# Patient Record
Sex: FEMALE | Race: BLACK OR AFRICAN AMERICAN | NOT HISPANIC OR LATINO | Employment: STUDENT | URBAN - METROPOLITAN AREA
[De-identification: names, ages, dates, MRNs, and addresses within clinical notes are randomized per-mention and may not be internally consistent; named-entity substitution may affect disease eponyms.]

---
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
## 2024-01-18 ENCOUNTER — OFFICE VISIT (OUTPATIENT)
Dept: SPORTS MEDICINE | Facility: CLINIC | Age: 19
End: 2024-01-18
Payer: MEDICAID

## 2024-01-18 ENCOUNTER — CLINICAL SUPPORT (OUTPATIENT)
Dept: REHABILITATION | Facility: HOSPITAL | Age: 19
End: 2024-01-18
Payer: COMMERCIAL

## 2024-01-18 VITALS
BODY MASS INDEX: 40.76 KG/M2 | SYSTOLIC BLOOD PRESSURE: 136 MMHG | WEIGHT: 268.94 LBS | DIASTOLIC BLOOD PRESSURE: 86 MMHG | HEIGHT: 68 IN | HEART RATE: 90 BPM

## 2024-01-18 DIAGNOSIS — R29.898 DECREASED STRENGTH OF LOWER EXTREMITY: ICD-10-CM

## 2024-01-18 DIAGNOSIS — M25.661 DECREASED ROM OF RIGHT KNEE: Primary | ICD-10-CM

## 2024-01-18 DIAGNOSIS — S83.271D COMPLEX TEAR OF LATERAL MENISCUS OF RIGHT KNEE AS CURRENT INJURY, SUBSEQUENT ENCOUNTER: ICD-10-CM

## 2024-01-18 DIAGNOSIS — Z98.890 S/P LATERAL MENISCUS REPAIR OF RIGHT KNEE: Primary | ICD-10-CM

## 2024-01-18 PROCEDURE — 99999 PR PBB SHADOW E&M-EST. PATIENT-LVL III: CPT | Mod: PBBFAC,,, | Performed by: STUDENT IN AN ORGANIZED HEALTH CARE EDUCATION/TRAINING PROGRAM

## 2024-01-18 PROCEDURE — 3075F SYST BP GE 130 - 139MM HG: CPT | Mod: CPTII,,, | Performed by: STUDENT IN AN ORGANIZED HEALTH CARE EDUCATION/TRAINING PROGRAM

## 2024-01-18 PROCEDURE — 97110 THERAPEUTIC EXERCISES: CPT

## 2024-01-18 PROCEDURE — 1159F MED LIST DOCD IN RCRD: CPT | Mod: CPTII,,, | Performed by: STUDENT IN AN ORGANIZED HEALTH CARE EDUCATION/TRAINING PROGRAM

## 2024-01-18 PROCEDURE — 3079F DIAST BP 80-89 MM HG: CPT | Mod: CPTII,,, | Performed by: STUDENT IN AN ORGANIZED HEALTH CARE EDUCATION/TRAINING PROGRAM

## 2024-01-18 PROCEDURE — 99024 POSTOP FOLLOW-UP VISIT: CPT | Mod: ,,, | Performed by: STUDENT IN AN ORGANIZED HEALTH CARE EDUCATION/TRAINING PROGRAM

## 2024-01-18 PROCEDURE — 99213 OFFICE O/P EST LOW 20 MIN: CPT | Mod: PBBFAC | Performed by: STUDENT IN AN ORGANIZED HEALTH CARE EDUCATION/TRAINING PROGRAM

## 2024-01-18 PROCEDURE — 1160F RVW MEDS BY RX/DR IN RCRD: CPT | Mod: CPTII,,, | Performed by: STUDENT IN AN ORGANIZED HEALTH CARE EDUCATION/TRAINING PROGRAM

## 2024-01-18 NOTE — PROGRESS NOTES
Physical Therapy Daily Treatment Note     Name: Kanika Thapa  Clinic Number: [id number]    Therapy Diagnosis:   Encounter Diagnoses   Name Primary?    Decreased ROM of right knee Yes    Decreased strength of lower extremity        Physician: Garcia Christianson MD    Visit Date: 1/18/2024    Physician Orders: PT Eval and Treat   Medical Diagnosis from Referral: S83.271A (ICD-10-CM) - Complex tear of lateral meniscus of right knee as current injury, initial encounter   Evaluation Date: 10/30/2023  Authorization Period Expiration: 12/31/2023  Plan of Care Expiration: 5/13/2024  Visit # / Visits authorized: 1/80 (14 total)  FOTO administered 1/18/24     Time In: 10:01 AM  Time Out: 11:00 AM  Total Billable Time: 59 minutes      Precautions: Standard     Procedure by Meghan: 10/27/23  Right knee arthroscopic lateral meniscus repair - 5 sutures  Right knee arthroscopic synovectomy and lysis of adhesions  Right knee arthroscopic chondroplasty of patella and lateral femoral condyle    POSTOPERATIVE PLAN: She will be placed in a T scope brace from 0-90 degrees. Nonweightbearing. Follow the rehabilitation protocol for meniscal repair. Return to clinic in 2 weeks. We will fit her for a lateral  brace.     12/7 MD note:   Continue physical therapy and advance per protocol as tolerated.  At this point we will leave the brace unlocked at all times while weight-bearing. Brace only needs to be worn while ambulating.  In about 1 week we can discontinue use of the brace.     Subjective     Pt reports: returned from trip to [place]. Knee felt really good the entire trip. I did a lot of walking but I feel like it helped me walk a little better. Just had f/u with Dr. Christianson and he was pleased with my progress up to this point  She was compliant with home exercise program.  Functional change: none    Pain: 0/10  Location: right knee      Objective     DOS: 10/27/23  POD: 11 weeks, 6 days as of 1/18    Knee Passive Range of  "Motion:    Right  Left    Flexion 125 135   Extension 10 hyper 15 hyper     Quad set: fair    Treatment      Kanika received therapeutic exercises to develop strength, endurance, ROM, flexibility, posture, and core stabilization for 8 minutes including:  LLLD heel prop x 8' 5# above/below      Not today:  Heel slides 3x10 5s holds  Calf raises behind mat 3 x 15  Pt education: issued printout of HEP with detailed order of exercises and frequency, use of single crutch if pain/swelling increases or if her gait is altered    Kanika received the following manual therapy techniques: Joint mobilizations and Soft tissue Mobilization were applied to the: knee for 02 minutes, including:  Assessment of knee ROM  Fat pad mobs  Patellar mobs  Ext hinge mob    Kanika participated in neuromuscular re-education activities to improve: Balance, Coordination, Kinesthetic, Sense, Proprioception, and Posture for 30 minutes. The following activities were included:  DL bridge 3x10 3" holds  Long sitting SLR 3x10  20" RLE bias box squat 3x12  Resisted lateral steps GTB x 3 laps  4" Lateral step down 3x10 with UE support     Not today  SL shuttle squat 2.5 cords 3x10B  Quad sets into hyperext c strapA 10" hold x 4'  Red power band TKE 3x10  6" forward step up 3x10  24" RLE bias box squat 3x10  DL shuttle squat 4 cords 3x10 above 90 deg  Red PB TKE 10" hold x 4'      Kanika participated in dynamic functional therapeutic activities to improve functional performance for 19 minutes including:  Stationary bike 10' to improve CV endurance and tissue extensibility   Split squat 2x10B    Not today:  45# sled push/pull x 3 laps      Home Exercises Provided and Patient Education Provided     Education provided:   - Pt educated on POC and HEP    Written Home Exercises Provided: yes.  Exercises were reviewed and Kanika was able to demonstrate them prior to the end of the session.  Kanika demonstrated good  understanding of the education provided.     See EMR " under Patient Instructions for exercises provided prior visit.    Assessment   Kanika returned to formal PT for first time since 12/29 after vacation. Upon assessment she continues to demo mild knee extension ROM deficits upon arrival as well as continues to lack terminal knee flexion ROM however seems to be improving each session. Session today focused towards progressive CKC functional movement patterns. Noticeable hip drop with lateral step down that improved with modifying to shorten box as well as with reps. Continues to be limited functionally 2/2 significant LE strength deficits. She would continue to benefit from skilled PT services to address the before mentioned deficits in order to normalize RLE function    Kanika Is progressing well towards her goals.   Pt prognosis is Good.     Pt will continue to benefit from skilled outpatient physical therapy to address the deficits listed in the problem list box on initial evaluation, provide pt/family education and to maximize pt's level of independence in the home and community environment.     Pt's spiritual, cultural and educational needs considered and pt agreeable to plan of care and goals.     Anticipated barriers to physical therapy: none    Goals:    Post-op Knee   Short Term Goals ( 10 Weeks ):   1. Pt will report reduced pain by 20 to 40% or greater for improved mobility, sleep  and ambulation.  MET  2. Patients knee edema reduced by 1/4 to 1/2 inch or greater to enhance flexion ROM and knee comfort. MET   3. Pt to report minimal to no pain to palpation for improved level of comfort.  MET  4. Pt to demonstrate symmetrical weight bearing w/o increased pain for stability and functional ambulation . IN progress  5. Pts neuromuscular response will be enhanced for unilateral standing stability and balance  Met  6. Pt to achieve 90 degrees of passive knee flexion for restoring functional knee mobility, ambulating with proper gait pattern and sit to stand ability.   MET  7 Pt to report understanding of all instruction pertinent to patient safety , gait instruction and HEP.    8. Pt to exhibit correct return demonstration of exercises for self-management and independence with HEP     Long Term Goals (28 Weeks):  1. Pt will demonstrate increased knee flexion AROM to 120 degrees or greater for return to functional activity  2. Pt will demonstrate increased knee extension AROM to 0 degrees for standing stability   3. Pt will demonstrate increased RLE strength by 1/2 to 1 grade for improved functional stability  4. Pt to demonstrate standing stability unsupported on dynamic surfaces for functional return to ADL and recreational activities.   5.The patient will be independent amb with no assistive device on all surfaces for community distances.  6. Pt to demonstrate independence with HEP for self management  7. Patient will return to jogging for fitness and recreation 3x/wk within 3 months.  8. Patient will improve FOTO score to see FOTO% limited to decrease perceived limitation with mobility.    Plan     Plan of care Certification: 10/30/2023 to 5/13/2024.     Outpatient Physical Therapy 1-2 times weekly for 28 weeks to include the following interventions: Electrical Stimulation NMES, Gait Training, Manual Therapy, Neuromuscular Re-ed, Patient Education, Self Care, Therapeutic Activities, and Therapeutic Exercise.       JÚNIOR HINTON, PT, DPT, SCS

## 2024-01-18 NOTE — PROGRESS NOTES
Subjective:     Chief Complaint: Kanika Thapa is a 18 y.o. female who had concerns including Post-op Evaluation of the Right Knee.    HPI    Kanika Thapa is a 18 y.o. female presents status post below.  She was doing well.  She has no pain.  She went to [place] several weeks ago and did a lot of walking and had no episodes of pain, mechanical symptoms, or instability with the knee.  She is very pleased with the progress she is made thus far.    PROCEDURE PERFORMED by Garcia Christianson MD on 10/27/2023:   Right knee arthroscopic lateral meniscus repair  Right knee arthroscopic synovectomy and lysis of adhesions  Right knee arthroscopic chondroplasty of patella and lateral femoral condyle    Review of Systems   Constitutional: Negative.   HENT: Negative.     Eyes: Negative.    Cardiovascular: Negative.    Respiratory: Negative.     Endocrine: Negative.    Hematologic/Lymphatic: Negative.    Skin: Negative.    Musculoskeletal:  Positive for muscle weakness. Negative for joint pain and stiffness.   Neurological: Negative.    Psychiatric/Behavioral: Negative.     Allergic/Immunologic: Negative.        Pain Related Questions  Over the past 3 days, what was your highest pain level?: 0  Over the past 3 days, what was your lowest pain level? : 0    Other  How many nights a week are you awakened by your affected body part?: 0  Was the patient's HEIGHT measured or patient reported?: Patient Reported  Was the patient's WEIGHT measured or patient reported?: Measured    Objective:     General: Kanika is well-developed, well-nourished, appears stated age, in no acute distress, alert and oriented to time, place and person.     General    Nursing note and vitals reviewed.  Constitutional: She is oriented to person, place, and time. She appears well-developed and well-nourished. No distress.   HENT:   Head: Normocephalic and atraumatic.   Nose: Nose normal.   Eyes: EOM are normal.   Cardiovascular:  Intact distal pulses.             Pulmonary/Chest: Effort normal. No respiratory distress.   Neurological: She is alert and oriented to person, place, and time.   Psychiatric: She has a normal mood and affect. Her behavior is normal. Judgment and thought content normal.     General Musculoskeletal Exam   Gait: normal       Right Knee Exam     Inspection   Erythema: absent  Scars: present  Swelling: absent  Effusion: absent  Deformity: absent  Bruising: absent    Range of Motion   Extension:  -5   Flexion:  130     Tests   Ligament Examination   Lachman: normal (-1 to 2mm)   PCL-Posterior Drawer: normal (0 to 2mm)     MCL - Valgus: normal (0 to 2mm)  LCL - Varus: normal    Other   Sensation: normal    Left Knee Exam     Range of Motion   Extension:  -10   Flexion:  140     Tests   Stability   Lachman: normal (-1 to 2mm)   PCL-Posterior Drawer: normal (0 to 2mm)  MCL - Valgus: normal (0 to 2mm)  LCL - Varus: normal (0 to 2mm)    Muscle Strength   Right Lower Extremity   Quadriceps:  4/5   Hamstrin/5     Vascular Exam     Right Pulses  Dorsalis Pedis:      2+  Posterior Tibial:      2+              Assessment:   Kanika Thapa is a 18 y.o. female status post above and doing well  Encounter Diagnoses   Name Primary?    S/P lateral meniscus repair of right knee Yes    Complex tear of lateral meniscus of right knee as current injury, subsequent encounter         Plan:     She is doing very well.  She can continue physical therapy and advance per protocol as tolerated.  Return to clinic in 2 months.    All of the patient's questions were answered. Patient was advised to call the clinic or contact me through the patient portal for any questions or concerns.         Patient questionnaires may have been collected.

## 2024-01-25 ENCOUNTER — CLINICAL SUPPORT (OUTPATIENT)
Dept: REHABILITATION | Facility: HOSPITAL | Age: 19
End: 2024-01-25
Payer: MEDICAID

## 2024-01-25 DIAGNOSIS — M25.661 DECREASED ROM OF RIGHT KNEE: Primary | ICD-10-CM

## 2024-01-25 DIAGNOSIS — R29.898 DECREASED STRENGTH OF LOWER EXTREMITY: ICD-10-CM

## 2024-01-25 PROCEDURE — 97110 THERAPEUTIC EXERCISES: CPT | Mod: CQ

## 2024-01-25 NOTE — PROGRESS NOTES
"  Physical Therapy Daily Treatment Note     Name: Kanika Thapa  Clinic Number: [id number]    Therapy Diagnosis:   Encounter Diagnoses   Name Primary?    Decreased ROM of right knee Yes    Decreased strength of lower extremity        Physician: Garcia Christianson MD    Visit Date: 1/25/2024    Physician Orders: PT Eval and Treat   Medical Diagnosis from Referral: S83.271A (ICD-10-CM) - Complex tear of lateral meniscus of right knee as current injury, initial encounter   Evaluation Date: 10/30/2023  Authorization Period Expiration: 12/31/2023  Plan of Care Expiration: 5/13/2024  Visit # / Visits authorized: 2/80 (15 total)  FOTO administered 1/18/24     Time In: 1210  Time Out: 1305  Total Billable Time: 53 minutes    Precautions: Standard     Procedure by Meghan: 10/27/23  Right knee arthroscopic lateral meniscus repair - 5 sutures  Right knee arthroscopic synovectomy and lysis of adhesions  Right knee arthroscopic chondroplasty of patella and lateral femoral condyle    POSTOPERATIVE PLAN: She will be placed in a T scope brace from 0-90 degrees. Nonweightbearing. Follow the rehabilitation protocol for meniscal repair. Return to clinic in 2 weeks. We will fit her for a lateral  brace.     Subjective     Pt reports: no c/o knee pain upon entry. She wants to know if she can go to the gym after PT today.     She was compliant with home exercise program.  Functional change: none    Pain: 0/10  Location: right knee      Objective     DOS: 10/27/23  POD: 12 weeks, 6 days as of 1/25    Knee Passive Range of Motion:    Right  Left    Flexion 125 135   Extension 10 hyper 15 hyper     Quad set: fair    Treatment      Approved codes: 87693    Kanika received therapeutic exercises to develop strength, endurance, ROM, flexibility, posture, and core stabilization for 50 minutes including:    Upright bike lvl 6 x 8' for joint mobility/cardiovascular endurance  Ext hinge strap stretch 5 x 20"   Quad sets into hyperext 10 x 10" " "  Long sitting SLR 3 x 8 x 3"   Waddles GTB x 2 turf laps  Attempted 20in RLE bias DL squats -> no bias DL squat, but d/c d/t poor form  DL leg press 125# 3 x 10  SL leg press 80# 3 x 8  SL calf raises 3 x 10 ofelia   Pt education: gym routine    Next tx - OKC quad    Not today:  LLLD heel prop x 8' 5# above/below  Heel slides 3x10 5s holds  DL bridge 3x10 3" holds  Red power band TKE 3x10  6" forward step up 3x10  Red PB TKE 10" hold x 4'  45# sled push/pull x 3 laps  4" Lateral step down 3x10 with UE support   Split squat 2x10B      Home Exercises Provided and Patient Education Provided     Education provided:   - Pt educated on POC and HEP    Written Home Exercises Provided: yes.  Exercises were reviewed and Kanika was able to demonstrate them prior to the end of the session.  Kanika demonstrated good  understanding of the education provided.     See EMR under Patient Instructions for exercises provided prior visit.    Assessment     Kanika did well today. She was challenged by resisted lateral walks and progressed leg press. Pt demonstrated poor DL squat form and could benefit from further neuro re-ed work. Reviewed gym routine. No adverse effects reported p tx.     Kanika Is progressing well towards her goals.   Pt prognosis is Good.     Pt will continue to benefit from skilled outpatient physical therapy to address the deficits listed in the problem list box on initial evaluation, provide pt/family education and to maximize pt's level of independence in the home and community environment.     Pt's spiritual, cultural and educational needs considered and pt agreeable to plan of care and goals.     Anticipated barriers to physical therapy: none    Goals:    Post-op Knee   Short Term Goals ( 10 Weeks ):   1. Pt will report reduced pain by 20 to 40% or greater for improved mobility, sleep  and ambulation.  MET  2. Patients knee edema reduced by 1/4 to 1/2 inch or greater to enhance flexion ROM and knee comfort. MET   3. " Pt to report minimal to no pain to palpation for improved level of comfort.  MET  4. Pt to demonstrate symmetrical weight bearing w/o increased pain for stability and functional ambulation . IN progress  5. Pts neuromuscular response will be enhanced for unilateral standing stability and balance  Met  6. Pt to achieve 90 degrees of passive knee flexion for restoring functional knee mobility, ambulating with proper gait pattern and sit to stand ability.  MET  7 Pt to report understanding of all instruction pertinent to patient safety , gait instruction and HEP.    8. Pt to exhibit correct return demonstration of exercises for self-management and independence with HEP     Long Term Goals (28 Weeks):  1. Pt will demonstrate increased knee flexion AROM to 120 degrees or greater for return to functional activity  2. Pt will demonstrate increased knee extension AROM to 0 degrees for standing stability   3. Pt will demonstrate increased RLE strength by 1/2 to 1 grade for improved functional stability  4. Pt to demonstrate standing stability unsupported on dynamic surfaces for functional return to ADL and recreational activities.   5.The patient will be independent amb with no assistive device on all surfaces for community distances.  6. Pt to demonstrate independence with HEP for self management  7. Patient will return to jogging for fitness and recreation 3x/wk within 3 months.  8. Patient will improve FOTO score to see FOTO% limited to decrease perceived limitation with mobility.    Plan     Plan of care Certification: 10/30/2023 to 5/13/2024.     Outpatient Physical Therapy 1-2 times weekly for 28 weeks to include the following interventions: Electrical Stimulation NMES, Gait Training, Manual Therapy, Neuromuscular Re-ed, Patient Education, Self Care, Therapeutic Activities, and Therapeutic Exercise.       Caitlyn Doss, PTA

## 2024-01-30 ENCOUNTER — CLINICAL SUPPORT (OUTPATIENT)
Dept: REHABILITATION | Facility: HOSPITAL | Age: 19
End: 2024-01-30
Attending: PEDIATRICS
Payer: COMMERCIAL

## 2024-01-30 DIAGNOSIS — M25.661 DECREASED ROM OF RIGHT KNEE: Primary | ICD-10-CM

## 2024-01-30 DIAGNOSIS — R29.898 DECREASED STRENGTH OF LOWER EXTREMITY: ICD-10-CM

## 2024-01-30 PROCEDURE — 97110 THERAPEUTIC EXERCISES: CPT | Mod: CQ

## 2024-01-30 NOTE — PROGRESS NOTES
"  Physical Therapy Daily Treatment Note     Name: Kanika Thapa  Clinic Number: [id number]    Therapy Diagnosis:   Encounter Diagnoses   Name Primary?    Decreased ROM of right knee Yes    Decreased strength of lower extremity      Physician: Garcia Christianson MD    Visit Date: 1/30/2024    Physician Orders: PT Eval and Treat   Medical Diagnosis from Referral: S83.271A (ICD-10-CM) - Complex tear of lateral meniscus of right knee as current injury, initial encounter   Evaluation Date: 10/30/2023  Authorization Period Expiration: 12/31/2023  Plan of Care Expiration: 5/13/2024  Visit # / Visits authorized: 3/80 (16 total)  FOTO administered 1/18/24     Time In: 0910  Time Out: 1000  Total Billable Time: 38 minutes    Precautions: Standard     Procedure by Meghan: 10/27/23  Right knee arthroscopic lateral meniscus repair - 5 sutures  Right knee arthroscopic synovectomy and lysis of adhesions  Right knee arthroscopic chondroplasty of patella and lateral femoral condyle    POSTOPERATIVE PLAN: She will be placed in a T scope brace from 0-90 degrees. Nonweightbearing. Follow the rehabilitation protocol for meniscal repair. Return to clinic in 2 weeks. We will fit her for a lateral  brace.     Subjective     Pt reports: no c/o knee pain upon entry.     She was compliant with home exercise program.  Functional change: none    Pain: 0/10  Location: right knee      Objective     DOS: 10/27/23  POD: 13 weeks, 4 days as of 1/30    Knee Passive Range of Motion:    Right  Left    Flexion 125 135   Extension 10 hyper 15 hyper     Quad set: fair    Treatment      Approved codes: 50993    Kanika received therapeutic exercises to develop strength, endurance, ROM, flexibility, posture, and core stabilization for 50 minutes including:    Upright bike lvl 6 x 8' for joint mobility/cardiovascular endurance  Ext hinge strap stretch 5 x 20"   Quad sets into hyperext c strapA 10" hold x 3'  Quad sets into hyperext 10 x 10"   Long sitting " "SLR 2 x 10 x 3"   Waddles GTB x 2 turf laps  LAQ hammer 5# 4 x 8  SL leg press 80# 3 x 12    Not today:  LLLD heel prop x 8' 5# above/below  Heel slides 3x10 5s holds  DL bridge 3x10 3" holds  Red power band TKE 3x10  6" forward step up 3x10  Red PB TKE 10" hold x 4'  45# sled push/pull x 3 laps  4" Lateral step down 3x10 with UE support   Split squat 2x10B  SL calf raises 3 x 10 ofelia   DL leg press 125# 3 x 10    Home Exercises Provided and Patient Education Provided     Education provided:   - Pt educated on POC and HEP    Written Home Exercises Provided: yes.  Exercises were reviewed and Kanika was able to demonstrate them prior to the end of the session.  Kanika demonstrated good  understanding of the education provided.     See EMR under Patient Instructions for exercises provided prior visit.    Assessment     Kanika did well today. She was challenged by resisted lateral walks and LAQ hammer. She could benefit from further quad strengthening and neuro re-ed work. No adverse effects reported p tx.     Kanika Is progressing well towards her goals.   Pt prognosis is Good.     Pt will continue to benefit from skilled outpatient physical therapy to address the deficits listed in the problem list box on initial evaluation, provide pt/family education and to maximize pt's level of independence in the home and community environment.     Pt's spiritual, cultural and educational needs considered and pt agreeable to plan of care and goals.     Anticipated barriers to physical therapy: none    Goals:    Post-op Knee   Short Term Goals ( 10 Weeks ):   1. Pt will report reduced pain by 20 to 40% or greater for improved mobility, sleep  and ambulation.  MET  2. Patients knee edema reduced by 1/4 to 1/2 inch or greater to enhance flexion ROM and knee comfort. MET   3. Pt to report minimal to no pain to palpation for improved level of comfort.  MET  4. Pt to demonstrate symmetrical weight bearing w/o increased pain for stability " and functional ambulation . IN progress  5. Pts neuromuscular response will be enhanced for unilateral standing stability and balance  Met  6. Pt to achieve 90 degrees of passive knee flexion for restoring functional knee mobility, ambulating with proper gait pattern and sit to stand ability.  MET  7 Pt to report understanding of all instruction pertinent to patient safety , gait instruction and HEP.    8. Pt to exhibit correct return demonstration of exercises for self-management and independence with HEP     Long Term Goals (28 Weeks):  1. Pt will demonstrate increased knee flexion AROM to 120 degrees or greater for return to functional activity  2. Pt will demonstrate increased knee extension AROM to 0 degrees for standing stability   3. Pt will demonstrate increased RLE strength by 1/2 to 1 grade for improved functional stability  4. Pt to demonstrate standing stability unsupported on dynamic surfaces for functional return to ADL and recreational activities.   5.The patient will be independent amb with no assistive device on all surfaces for community distances.  6. Pt to demonstrate independence with HEP for self management  7. Patient will return to jogging for fitness and recreation 3x/wk within 3 months.  8. Patient will improve FOTO score to see FOTO% limited to decrease perceived limitation with mobility.    Plan     Plan of care Certification: 10/30/2023 to 5/13/2024.     Outpatient Physical Therapy 1-2 times weekly for 28 weeks to include the following interventions: Electrical Stimulation NMES, Gait Training, Manual Therapy, Neuromuscular Re-ed, Patient Education, Self Care, Therapeutic Activities, and Therapeutic Exercise.       Caitlyn Doss, PTA

## 2024-02-06 ENCOUNTER — CLINICAL SUPPORT (OUTPATIENT)
Dept: REHABILITATION | Facility: HOSPITAL | Age: 19
End: 2024-02-06
Payer: COMMERCIAL

## 2024-02-06 DIAGNOSIS — R29.898 DECREASED STRENGTH OF LOWER EXTREMITY: ICD-10-CM

## 2024-02-06 DIAGNOSIS — M25.661 DECREASED ROM OF RIGHT KNEE: Primary | ICD-10-CM

## 2024-02-06 PROCEDURE — 97110 THERAPEUTIC EXERCISES: CPT | Mod: CQ

## 2024-02-06 NOTE — PROGRESS NOTES
"  Physical Therapy Daily Treatment Note     Name: Kanika Thapa  Clinic Number: [id number]    Therapy Diagnosis:   Encounter Diagnoses   Name Primary?    Decreased ROM of right knee Yes    Decreased strength of lower extremity      Physician: Garcia Christianson MD    Visit Date: 2/6/2024    Physician Orders: PT Eval and Treat   Medical Diagnosis from Referral: S83.271A (ICD-10-CM) - Complex tear of lateral meniscus of right knee as current injury, initial encounter   Evaluation Date: 10/30/2023  Authorization Period Expiration: 12/31/2023  Plan of Care Expiration: 5/13/2024  Visit # / Visits authorized: 4/80 (16 total)  FOTO administered 1/18/24     Time In: 0920 (arrived 13' late)  Time Out: 1007  Total Billable Time: 38 minutes  PT tech was utilized during today's tx     Precautions: Standard     Procedure by Meghan: 10/27/23  Right knee arthroscopic lateral meniscus repair - 5 sutures  Right knee arthroscopic synovectomy and lysis of adhesions  Right knee arthroscopic chondroplasty of patella and lateral femoral condyle    POSTOPERATIVE PLAN: She will be placed in a T scope brace from 0-90 degrees. Nonweightbearing. Follow the rehabilitation protocol for meniscal repair. Return to clinic in 2 weeks. We will fit her for a lateral  brace.     Subjective     Pt reports: no c/o knee pain upon entry. Reports muscle soreness p previous tx.     She was compliant with home exercise program.  Functional change: none    Pain: 0/10  Location: right knee      Objective     DOS: 10/27/23  POD: 14 weeks, 4 days as of 2/6    Knee Passive Range of Motion:    Right  Left    Flexion 125 135   Extension 10 hyper 15 hyper     Quad set: fair    Treatment      Approved codes: 63246    Kanika received therapeutic exercises to develop strength, endurance, ROM, flexibility, posture, and core stabilization for 45 minutes including:    Upright bike lvl 6 x 8' for joint mobility/cardiovascular endurance  Ext hinge strap stretch 5 x 20" " "  Quad sets into hyperext c strapA 10" hold x 3'  Quad sets into hyperext 10 x 10"   Long sitting SLR 3 x 10 x 3"   LAQ hammer 2.5# 4 x 8  SL leg press 85# 3 x 10  6in forward step ups c dowel support x 30    Next tx - step ups/step downs    Not today:  Waddles GTB x 2 turf laps  LLLD heel prop x 8' 5# above/below  Heel slides 3x10 5s holds  DL bridge 3x10 3" holds  Red power band TKE 3x10  6" forward step up 3x10  Red PB TKE 10" hold x 4'  45# sled push/pull x 3 laps  4" Lateral step down 3x10 with UE support   Split squat 2x10B  SL calf raises 3 x 10 ofelia   DL leg press 125# 3 x 10      Home Exercises Provided and Patient Education Provided     Education provided:   - Pt educated on POC and HEP    Written Home Exercises Provided: yes.  Exercises were reviewed and Kanika was able to demonstrate them prior to the end of the session.  Kanika demonstrated good  understanding of the education provided.     See EMR under Patient Instructions for exercises provided prior visit.    Assessment     Treatments continue to be limited d/t late arrivals. She was challenged by [facility] knee ext and had to regress load. Pt was initially apprehensive performing step ups and requested to work on step up/downs next tx. No adverse effects reported p tx.     Kanika Is progressing well towards her goals.   Pt prognosis is Good.     Pt will continue to benefit from skilled outpatient physical therapy to address the deficits listed in the problem list box on initial evaluation, provide pt/family education and to maximize pt's level of independence in the home and community environment.     Pt's spiritual, cultural and educational needs considered and pt agreeable to plan of care and goals.     Anticipated barriers to physical therapy: none    Goals:    Post-op Knee   Short Term Goals ( 10 Weeks ):   1. Pt will report reduced pain by 20 to 40% or greater for improved mobility, sleep  and ambulation.  MET  2. Patients knee edema reduced by 1/4 to " 1/2 inch or greater to enhance flexion ROM and knee comfort. MET   3. Pt to report minimal to no pain to palpation for improved level of comfort.  MET  4. Pt to demonstrate symmetrical weight bearing w/o increased pain for stability and functional ambulation . IN progress  5. Pts neuromuscular response will be enhanced for unilateral standing stability and balance  Met  6. Pt to achieve 90 degrees of passive knee flexion for restoring functional knee mobility, ambulating with proper gait pattern and sit to stand ability.  MET  7 Pt to report understanding of all instruction pertinent to patient safety , gait instruction and HEP.    8. Pt to exhibit correct return demonstration of exercises for self-management and independence with HEP     Long Term Goals (28 Weeks):  1. Pt will demonstrate increased knee flexion AROM to 120 degrees or greater for return to functional activity  2. Pt will demonstrate increased knee extension AROM to 0 degrees for standing stability   3. Pt will demonstrate increased RLE strength by 1/2 to 1 grade for improved functional stability  4. Pt to demonstrate standing stability unsupported on dynamic surfaces for functional return to ADL and recreational activities.   5.The patient will be independent amb with no assistive device on all surfaces for community distances.  6. Pt to demonstrate independence with HEP for self management  7. Patient will return to jogging for fitness and recreation 3x/wk within 3 months.  8. Patient will improve FOTO score to see FOTO% limited to decrease perceived limitation with mobility.    Plan     Plan of care Certification: 10/30/2023 to 5/13/2024.     Outpatient Physical Therapy 1-2 times weekly for 28 weeks to include the following interventions: Electrical Stimulation NMES, Gait Training, Manual Therapy, Neuromuscular Re-ed, Patient Education, Self Care, Therapeutic Activities, and Therapeutic Exercise.       Caitlyn Doss, PTA

## 2024-02-26 ENCOUNTER — CLINICAL SUPPORT (OUTPATIENT)
Dept: REHABILITATION | Facility: HOSPITAL | Age: 19
End: 2024-02-26
Payer: COMMERCIAL

## 2024-02-26 DIAGNOSIS — R29.898 DECREASED STRENGTH OF LOWER EXTREMITY: ICD-10-CM

## 2024-02-26 DIAGNOSIS — M25.661 DECREASED ROM OF RIGHT KNEE: Primary | ICD-10-CM

## 2024-02-26 PROCEDURE — 97110 THERAPEUTIC EXERCISES: CPT

## 2024-02-26 NOTE — PROGRESS NOTES
Physical Therapy Daily Treatment Note     Name: Kanika Thapa  Clinic Number: [id number]    Therapy Diagnosis:   Encounter Diagnoses   Name Primary?    Decreased ROM of right knee Yes    Decreased strength of lower extremity        Physician: Garcia Christianson MD    Visit Date: 2/26/2024    Physician Orders: PT Eval and Treat   Medical Diagnosis from Referral: S83.271A (ICD-10-CM) - Complex tear of lateral meniscus of right knee as current injury, initial encounter   Evaluation Date: 10/30/2023  Authorization Period Expiration: 12/31/2023  Plan of Care Expiration: 5/13/2024  Visit # / Visits authorized: 5/80 (18 total)  FOTO administered 1/18/24     Time In: 2:25 PM  Time Out: 3:20 PM  Total Billable Time: 55 minutes  PT tech was utilized during today's tx     Precautions: Standard     Procedure by Meghan: 10/27/23  Right knee arthroscopic lateral meniscus repair - 5 sutures  Right knee arthroscopic synovectomy and lysis of adhesions  Right knee arthroscopic chondroplasty of patella and lateral femoral condyle    POSTOPERATIVE PLAN: She will be placed in a T scope brace from 0-90 degrees. Nonweightbearing. Follow the rehabilitation protocol for meniscal repair. Return to clinic in 2 weeks. We will fit her for a lateral  brace.     Subjective     Pt reports: knee has been feeling good. Had to miss last few weeks because my school schedule has been really busy    She was compliant with home exercise program.  Functional change: none    Pain: 0/10  Location: right knee      Objective     DOS: 10/27/23  POD: 17 weeks, 3 days as of 2/26    Knee Passive Range of Motion:    Right  Left    Flexion 125 135   Extension 10 hyper 15 hyper     Quad set: fair    Treatment      Approved codes: 41509    Kanika received therapeutic exercises to develop strength, endurance, ROM, flexibility, posture, and core stabilization for 55 minutes including:  Upright bike lvl 6 x 8' for joint mobility/cardiovascular endurance  Long  "sitting SLR 3 x 15 x 3"   SL shuttle squat 3 cords 3x10  Mini split squat 3x10: with UE support  LAQ hammer 15# 4 x 8  Resisted lateral steps BTB x 3 laps        Not today:  6in forward step ups c dowel support x 30  Ext hinge strap stretch 5 x 20"   Quad sets into hyperext c strapA 10" hold x 3'  Quad sets into hyperext 10 x 10"   Waddles GTB x 2 turf laps  LLLD heel prop x 8' 5# above/below  Heel slides 3x10 5s holds  DL bridge 3x10 3" holds  Red power band TKE 3x10  6" forward step up 3x10  Red PB TKE 10" hold x 4'  45# sled push/pull x 3 laps  4" Lateral step down 3x10 with UE support   Split squat 2x10B  SL calf raises 3 x 10 ofelia   DL leg press 125# 3 x 10      Home Exercises Provided and Patient Education Provided     Education provided:   - Pt educated on POC and HEP    Written Home Exercises Provided: yes.  Exercises were reviewed and Kanika was able to demonstrate them prior to the end of the session.  Kanika demonstrated good  understanding of the education provided.     See EMR under Patient Instructions for exercises provided prior visit.    Assessment     Kanika completed session as noted above. ROM continues to be symmetrical however her quad strength remains very weak limiting her overall functional capacity. Discussed the need to obtain gym membership in order to facilitate increased strength gains between sessions and I provided her with a copy of gym based HEP program to initiate outside of PT. She has notable shaking and instability while performing lateral step down as well as with mini split squats. At this time she would continue to benefit from skilled PT services to address the before mentioned deficits in order to decrease risk of reinjury and return to PLOF  Kanika Is progressing well towards her goals.   Pt prognosis is Good.     Pt will continue to benefit from skilled outpatient physical therapy to address the deficits listed in the problem list box on initial evaluation, provide pt/family " education and to maximize pt's level of independence in the home and community environment.     Pt's spiritual, cultural and educational needs considered and pt agreeable to plan of care and goals.     Anticipated barriers to physical therapy: none    Goals:    Post-op Knee   Short Term Goals ( 10 Weeks ):   1. Pt will report reduced pain by 20 to 40% or greater for improved mobility, sleep  and ambulation.  MET  2. Patients knee edema reduced by 1/4 to 1/2 inch or greater to enhance flexion ROM and knee comfort. MET   3. Pt to report minimal to no pain to palpation for improved level of comfort.  MET  4. Pt to demonstrate symmetrical weight bearing w/o increased pain for stability and functional ambulation . IN progress  5. Pts neuromuscular response will be enhanced for unilateral standing stability and balance  Met  6. Pt to achieve 90 degrees of passive knee flexion for restoring functional knee mobility, ambulating with proper gait pattern and sit to stand ability.  MET  7 Pt to report understanding of all instruction pertinent to patient safety , gait instruction and HEP.    8. Pt to exhibit correct return demonstration of exercises for self-management and independence with HEP     Long Term Goals (28 Weeks):  1. Pt will demonstrate increased knee flexion AROM to 120 degrees or greater for return to functional activity  2. Pt will demonstrate increased knee extension AROM to 0 degrees for standing stability   3. Pt will demonstrate increased RLE strength by 1/2 to 1 grade for improved functional stability  4. Pt to demonstrate standing stability unsupported on dynamic surfaces for functional return to ADL and recreational activities.   5.The patient will be independent amb with no assistive device on all surfaces for community distances.  6. Pt to demonstrate independence with HEP for self management  7. Patient will return to jogging for fitness and recreation 3x/wk within 3 months.  8. Patient will improve  FOTO score to see FOTO% limited to decrease perceived limitation with mobility.    Plan     Plan of care Certification: 10/30/2023 to 5/13/2024.     Outpatient Physical Therapy 1-2 times weekly for 28 weeks to include the following interventions: Electrical Stimulation NMES, Gait Training, Manual Therapy, Neuromuscular Re-ed, Patient Education, Self Care, Therapeutic Activities, and Therapeutic Exercise.       JÚNIOR HINTON, PT, DPT, SCS

## 2024-03-05 ENCOUNTER — CLINICAL SUPPORT (OUTPATIENT)
Dept: REHABILITATION | Facility: HOSPITAL | Age: 19
End: 2024-03-05
Payer: COMMERCIAL

## 2024-03-05 DIAGNOSIS — M25.661 DECREASED ROM OF RIGHT KNEE: Primary | ICD-10-CM

## 2024-03-05 DIAGNOSIS — R29.898 DECREASED STRENGTH OF LOWER EXTREMITY: ICD-10-CM

## 2024-03-05 PROCEDURE — 97110 THERAPEUTIC EXERCISES: CPT | Mod: CQ

## 2024-03-05 NOTE — PROGRESS NOTES
Physical Therapy Daily Treatment Note     Name: Kanika Thapa  Clinic Number: [id number]    Therapy Diagnosis:   Encounter Diagnoses   Name Primary?    Decreased ROM of right knee Yes    Decreased strength of lower extremity      Physician: Garcia Christianson MD    Visit Date: 3/5/2024    Physician Orders: PT Eval and Treat   Medical Diagnosis from Referral: S83.271A (ICD-10-CM) - Complex tear of lateral meniscus of right knee as current injury, initial encounter   Evaluation Date: 10/30/2023  Authorization Period Expiration: 12/31/2023  Plan of Care Expiration: 5/13/2024  Visit # / Visits authorized: 6/80 (19 total)  FOTO administered 1/18/24     Time In: 1117 (arrived 11' late)  Time Out: 1200  Total Billable Time: 23 minutes    Precautions: Standard     Procedure by Meghan: 10/27/23  Right knee arthroscopic lateral meniscus repair - 5 sutures  Right knee arthroscopic synovectomy and lysis of adhesions  Right knee arthroscopic chondroplasty of patella and lateral femoral condyle    POSTOPERATIVE PLAN: She will be placed in a T scope brace from 0-90 degrees. Nonweightbearing. Follow the rehabilitation protocol for meniscal repair. Return to clinic in 2 weeks. We will fit her for a lateral  brace.     Subjective     Pt reports: no new complaints.     She was compliant with home exercise program.  Functional change: none    Pain: 0/10  Location: right knee      Objective     DOS: 10/27/23  POD: 18 weeks, 4 days as of 3/5    Knee Passive Range of Motion:    Right  Left    Flexion 125 135   Extension 10 hyper 15 hyper     Treatment      Approved codes: 64805    Kanika received therapeutic exercises to develop strength, endurance, ROM, flexibility, posture, and core stabilization for 40 minutes including:    Upright bike lvl 6 x 5' for joint mobility/cardiovascular endurance  Waddles GTB x 2 turf laps  LAQ matrix 15# 4 x 6 ofelia   Split squats 3 x 10 ofelia   SL leg press 90# 3 x 10 ofelia   Sled push 90# x 2 turf  "laps    Not today:  6in forward step ups c dowel support x 30  Ext hinge strap stretch 5 x 20"   Quad sets into hyperext c strapA 10" hold x 3'  Quad sets into hyperext 10 x 10"   Waddles GTB x 2 turf laps  LLLD heel prop x 8' 5# above/below  Heel slides 3x10 5s holds  DL bridge 3x10 3" holds  Red power band TKE 3x10  6" forward step up 3x10  Red PB TKE 10" hold x 4'  45# sled push/pull x 3 laps  4" Lateral step down 3x10 with UE support   Split squat 2x10B  SL calf raises 3 x 10 ofelia   DL leg press 125# 3 x 10      Home Exercises Provided and Patient Education Provided     Education provided:   - Pt educated on POC and HEP    Written Home Exercises Provided: yes.  Exercises were reviewed and Kanika was able to demonstrate them prior to the end of the session.  Kanika demonstrated good  understanding of the education provided.     See EMR under Patient Instructions for exercises provided prior visit.    Assessment     Today's tx was limited d/t late arrival. Continued quad strength deficits with muscle shaking noted during LAQ. Progress in global strength is limited by noncompliance with gym, breaks in care, and late arrivals/shortened treatments. Will progress as able. No adverse effects reported p tx.     Kanika Is progressing well towards her goals.   Pt prognosis is Good.     Pt will continue to benefit from skilled outpatient physical therapy to address the deficits listed in the problem list box on initial evaluation, provide pt/family education and to maximize pt's level of independence in the home and community environment.     Pt's spiritual, cultural and educational needs considered and pt agreeable to plan of care and goals.     Anticipated barriers to physical therapy: none    Goals:    Post-op Knee   Short Term Goals ( 10 Weeks ):   1. Pt will report reduced pain by 20 to 40% or greater for improved mobility, sleep  and ambulation.  MET  2. Patients knee edema reduced by 1/4 to 1/2 inch or greater to " enhance flexion ROM and knee comfort. MET   3. Pt to report minimal to no pain to palpation for improved level of comfort.  MET  4. Pt to demonstrate symmetrical weight bearing w/o increased pain for stability and functional ambulation . IN progress  5. Pts neuromuscular response will be enhanced for unilateral standing stability and balance  Met  6. Pt to achieve 90 degrees of passive knee flexion for restoring functional knee mobility, ambulating with proper gait pattern and sit to stand ability.  MET  7 Pt to report understanding of all instruction pertinent to patient safety , gait instruction and HEP.    8. Pt to exhibit correct return demonstration of exercises for self-management and independence with HEP     Long Term Goals (28 Weeks):  1. Pt will demonstrate increased knee flexion AROM to 120 degrees or greater for return to functional activity  2. Pt will demonstrate increased knee extension AROM to 0 degrees for standing stability   3. Pt will demonstrate increased RLE strength by 1/2 to 1 grade for improved functional stability  4. Pt to demonstrate standing stability unsupported on dynamic surfaces for functional return to ADL and recreational activities.   5.The patient will be independent amb with no assistive device on all surfaces for community distances.  6. Pt to demonstrate independence with HEP for self management  7. Patient will return to jogging for fitness and recreation 3x/wk within 3 months.  8. Patient will improve FOTO score to see FOTO% limited to decrease perceived limitation with mobility.    Plan     Plan of care Certification: 10/30/2023 to 5/13/2024.     Outpatient Physical Therapy 1-2 times weekly for 28 weeks to include the following interventions: Electrical Stimulation NMES, Gait Training, Manual Therapy, Neuromuscular Re-ed, Patient Education, Self Care, Therapeutic Activities, and Therapeutic Exercise.       Caitlyn Doss, PTA

## 2024-03-19 ENCOUNTER — OFFICE VISIT (OUTPATIENT)
Dept: SPORTS MEDICINE | Facility: CLINIC | Age: 19
End: 2024-03-19
Payer: MEDICAID

## 2024-03-19 VITALS
WEIGHT: 271.19 LBS | HEART RATE: 76 BPM | HEIGHT: 68 IN | DIASTOLIC BLOOD PRESSURE: 82 MMHG | SYSTOLIC BLOOD PRESSURE: 128 MMHG | BODY MASS INDEX: 41.1 KG/M2

## 2024-03-19 DIAGNOSIS — Z98.890 S/P LATERAL MENISCUS REPAIR OF RIGHT KNEE: Primary | ICD-10-CM

## 2024-03-19 DIAGNOSIS — S83.271D COMPLEX TEAR OF LATERAL MENISCUS OF RIGHT KNEE AS CURRENT INJURY, SUBSEQUENT ENCOUNTER: ICD-10-CM

## 2024-03-19 PROCEDURE — 99999 PR PBB SHADOW E&M-EST. PATIENT-LVL III: CPT | Mod: PBBFAC,,, | Performed by: STUDENT IN AN ORGANIZED HEALTH CARE EDUCATION/TRAINING PROGRAM

## 2024-03-19 PROCEDURE — 1159F MED LIST DOCD IN RCRD: CPT | Mod: CPTII,S$GLB,, | Performed by: STUDENT IN AN ORGANIZED HEALTH CARE EDUCATION/TRAINING PROGRAM

## 2024-03-19 PROCEDURE — 3008F BODY MASS INDEX DOCD: CPT | Mod: CPTII,S$GLB,, | Performed by: STUDENT IN AN ORGANIZED HEALTH CARE EDUCATION/TRAINING PROGRAM

## 2024-03-19 PROCEDURE — 99213 OFFICE O/P EST LOW 20 MIN: CPT | Mod: PBBFAC | Performed by: STUDENT IN AN ORGANIZED HEALTH CARE EDUCATION/TRAINING PROGRAM

## 2024-03-19 PROCEDURE — 3079F DIAST BP 80-89 MM HG: CPT | Mod: CPTII,S$GLB,, | Performed by: STUDENT IN AN ORGANIZED HEALTH CARE EDUCATION/TRAINING PROGRAM

## 2024-03-19 PROCEDURE — 3074F SYST BP LT 130 MM HG: CPT | Mod: CPTII,S$GLB,, | Performed by: STUDENT IN AN ORGANIZED HEALTH CARE EDUCATION/TRAINING PROGRAM

## 2024-03-19 PROCEDURE — 1160F RVW MEDS BY RX/DR IN RCRD: CPT | Mod: CPTII,S$GLB,, | Performed by: STUDENT IN AN ORGANIZED HEALTH CARE EDUCATION/TRAINING PROGRAM

## 2024-03-19 PROCEDURE — 99213 OFFICE O/P EST LOW 20 MIN: CPT | Mod: S$GLB,,, | Performed by: STUDENT IN AN ORGANIZED HEALTH CARE EDUCATION/TRAINING PROGRAM

## 2024-03-19 NOTE — PROGRESS NOTES
Subjective:     Chief Complaint: Kanika Thapa is a 19 y.o. female who had concerns including Follow-up of the Right Knee.    HPI    Kanika Thapa is a 19 y.o. female presents status post below.  She is doing very well.  She has no pain.  She has been in physical therapy making appropriate progress.  She is anxious to begin jogging return to sport.  Denies any mechanical symptoms or instability.    PROCEDURE PERFORMED by Garcia Christianson MD on 10/27/2023:   Right knee arthroscopic lateral meniscus repair  Right knee arthroscopic synovectomy and lysis of adhesions  Right knee arthroscopic chondroplasty of patella and lateral femoral condyle    Past Medical History:   Diagnosis Date    ADHD     ADHD     Eczema        Current Outpatient Medications on File Prior to Visit   Medication Sig Dispense Refill    celecoxib (CELEBREX) 200 MG capsule Take 1 capsule (200 mg total) by mouth 2 (two) times daily with meals. 60 capsule 0    dextroamphetamine-amphetamine (ADDERALL XR) 20 MG 24 hr capsule Take after school (afternoon)      dextroamphetamine-amphetamine (ADDERALL XR) 30 MG 24 hr capsule Take by mouth every morning.      oxyCODONE-acetaminophen (PERCOCET)  mg per tablet Take 1 tablet by mouth.      UNABLE TO FIND Take by mouth. Birth control      aspirin (ECOTRIN) 81 MG EC tablet Take 1 tablet (81 mg total) by mouth once daily. 42 tablet 0    diclofenac sodium (VOLTAREN) 1 % Gel Apply 2 g topically 4 (four) times daily. for 14 days (Patient not taking: Reported on 10/3/2023) 50 g 0    HYDROcodone-acetaminophen (NORCO) 5-325 mg per tablet Take 1 tablet by mouth every 4 (four) hours as needed for Pain. (Patient not taking: Reported on 7/7/2022) 25 tablet 0    hydrOXYzine pamoate (VISTARIL) 50 MG Cap Take 1 capsule (50 mg total) by mouth nightly as needed (for insomnia). (Patient not taking: Reported on 10/3/2023) 15 capsule 0    ibuprofen (ADVIL,MOTRIN) 800 MG tablet Take 1 tablet (800 mg total) by mouth every 6 (six)  hours as needed for Pain. (Patient not taking: Reported on 4/6/2023) 20 tablet 0    methocarbamoL (ROBAXIN) 500 MG Tab Take 1 tablet (500 mg total) by mouth 3 (three) times daily as needed (muscle spasms). (Patient not taking: Reported on 11/9/2023) 30 tablet 0    metoclopramide HCl (REGLAN) 10 MG tablet Take 1 tablet (10 mg total) by mouth every 6 (six) hours as needed. (Patient not taking: Reported on 10/3/2023) 30 tablet 0    naproxen (NAPROSYN) 500 MG tablet Take 1 tablet (500 mg total) by mouth 2 (two) times daily with meals. (Patient not taking: Reported on 10/3/2023) 20 tablet 0    oxyCODONE (ROXICODONE) 5 MG immediate release tablet Take 1 tablet (5 mg total) by mouth every 6 (six) hours as needed for Pain. (Patient not taking: Reported on 12/7/2023) 28 tablet 0    promethazine (PHENERGAN) 25 MG tablet Take 1 tablet (25 mg total) by mouth every 6 (six) hours as needed for Nausea. (Patient not taking: Reported on 12/7/2023) 30 tablet 0     Current Facility-Administered Medications on File Prior to Visit   Medication Dose Route Frequency Provider Last Rate Last Admin    fentaNYL 50 mcg/mL injection  mcg   mcg Intravenous PRN Shmuel Godinez MD   100 mcg at 10/27/23 0755    midazolam (VERSED) 1 mg/mL injection 0.5-4 mg  0.5-4 mg Intravenous PRN Shmuel Godinez MD   2 mg at 10/27/23 0755    ropivacaine 0.2% [facility] Pump infusion 500 ML   Perineural Continuous Shmuel Godinez MD   New Bag at 10/27/23 1119       Past Surgical History:   Procedure Laterality Date    ARTHROSCOPY,KNEE,WITH MENISCUS REPAIR Right 10/27/2023    Procedure: ARTHROSCOPY,KNEE,WITH MENISCUS REPAIR;  Surgeon: Garcia Christianson MD;  Location: [facility] OR;  Service: Orthopedics;  Laterality: Right;  with catheter    CHONDROPLASTY OF KNEE Right 10/27/2023    Procedure: CHONDROPLASTY, KNEE;  Surgeon: Garcia Christianson MD;  Location: [facility] OR;  Service: Orthopedics;  Laterality: Right;    KNEE ARTHROSCOPY W/ ACL RECONSTRUCTION  Right 2/17/2022    Procedure: RECONSTRUCTION, KNEE, ACL, ARTHROSCOPIC (RIGHT);  Surgeon: Andrea Powell MD;  Location: [facility] OR [address] [place] [address];  Service: Orthopedics;  Laterality: Right;  Quad tendon autograft with bone block     SYNOVECTOMY OF KNEE Right 10/27/2023    Procedure: SYNOVECTOMY, KNEE;  Surgeon: Garcia Christianson MD;  Location: [facility];  Service: Orthopedics;  Laterality: Right;       History reviewed. No pertinent family history.    Social History     Socioeconomic History    Marital status: Single   Tobacco Use    Smoking status: Never    Smokeless tobacco: Never   Substance and Sexual Activity    Alcohol use: No    Drug use: No    Sexual activity: Not Currently       Review of Systems   Constitutional: Negative.   HENT: Negative.     Eyes: Negative.    Cardiovascular: Negative.    Respiratory: Negative.     Endocrine: Negative.    Hematologic/Lymphatic: Negative.    Skin: Negative.    Musculoskeletal:  Negative for joint pain, muscle weakness and stiffness.   Neurological: Negative.    Psychiatric/Behavioral: Negative.     Allergic/Immunologic: Negative.        Pain Related Questions  Over the past 3 days, what was your average pain during activity? (I.e. running, jogging, walking, climbing stairs, getting dressed, ect.): 0  Over the past 3 days, what was your highest pain level?: 0  Over the past 3 days, what was your lowest pain level? : 0    Other  How many nights a week are you awakened by your affected body part?: 0  Was the patient's HEIGHT measured or patient reported?: Patient Reported  Was the patient's WEIGHT measured or patient reported?: Measured    Objective:     General: Kanika is well-developed, well-nourished, appears stated age, in no acute distress, alert and oriented to time, place and person.     General    Nursing note and vitals reviewed.  Constitutional: She is oriented to person, place, and time. She appears well-developed and well-nourished. No distress.   HENT:   Head:  Normocephalic and atraumatic.   Nose: Nose normal.   Eyes: EOM are normal.   Cardiovascular:  Intact distal pulses.            Pulmonary/Chest: Effort normal. No respiratory distress.   Neurological: She is alert and oriented to person, place, and time.   Psychiatric: She has a normal mood and affect. Her behavior is normal. Judgment and thought content normal.     General Musculoskeletal Exam   Gait: normal       Right Knee Exam     Inspection   Erythema: absent  Scars: present  Swelling: absent  Effusion: absent  Deformity: absent  Bruising: absent    Range of Motion   Extension:  -5   Flexion:  140     Tests   Ligament Examination   Lachman: normal (-1 to 2mm)   PCL-Posterior Drawer: normal (0 to 2mm)     MCL - Valgus: normal (0 to 2mm)  LCL - Varus: normal    Other   Sensation: normal    Left Knee Exam     Range of Motion   Extension:  -10   Flexion:  140     Tests   Stability   Lachman: normal (-1 to 2mm)   PCL-Posterior Drawer: normal (0 to 2mm)  MCL - Valgus: normal (0 to 2mm)  LCL - Varus: normal (0 to 2mm)    Muscle Strength   Right Lower Extremity   Quadriceps:  5/5   Hamstrin/5     Vascular Exam     Right Pulses  Dorsalis Pedis:      2+  Posterior Tibial:      2+              Assessment:   Kanika Thapa is a 19 y.o. female status post above and doing well  Encounter Diagnoses   Name Primary?    S/P lateral meniscus repair of right knee Yes    Complex tear of lateral meniscus of right knee as current injury, subsequent encounter         Plan:     She continues to do very well.  Okay to begin jogging and gradual return to full activities.  Return to clinic in 3 months.      All of the patient's questions were answered. Patient was advised to call the clinic or contact me through the patient portal for any questions or concerns.         Patient questionnaires may have been collected.

## 2024-05-10 ENCOUNTER — TELEPHONE (OUTPATIENT)
Dept: ENDOCRINOLOGY | Facility: CLINIC | Age: 19
End: 2024-05-10
Payer: COMMERCIAL

## (undated) DEVICE — BLADE ELECTRO EDGE INSULATED

## (undated) DEVICE — MAT SURGICAL ECOSUCTIONER

## (undated) DEVICE — DRESSING XEROFORM FOIL PK 1X8

## (undated) DEVICE — BLADE SAGITTA 5/BX

## (undated) DEVICE — TUBE SET INFLOW/OUTFLOW

## (undated) DEVICE — ADHESIVE DERMABOND ADVANCED

## (undated) DEVICE — SUT LOOP 2 HF 20 SN WH BL

## (undated) DEVICE — GAUZE SPONGE 4X4 12PLY

## (undated) DEVICE — NDL SPINAL 18GX3.5 SPINOCAN

## (undated) DEVICE — PAD CAST SPECIALIST STRL 6

## (undated) DEVICE — DRAPE MINI C-ARM 54 X 64

## (undated) DEVICE — TOURNIQUET SB QC DP 34X4IN

## (undated) DEVICE — SUT ETHILON 3-0 PS2 18 BLK

## (undated) DEVICE — BLADE SHAVER LANZA 4.2X13CM

## (undated) DEVICE — DRAPE STERI U-SHAPED 47X51IN

## (undated) DEVICE — SEE MEDLINE ITEM 146271

## (undated) DEVICE — COVER LIGHT HANDLE 80/CA

## (undated) DEVICE — TOURNIQUET HEMACLEAR X-LARGE

## (undated) DEVICE — PROBE ARTHSCP EDGE ENERGY 50

## (undated) DEVICE — GLOVE PROTEXIS LIGHT BROWN 8.5

## (undated) DEVICE — GLOVE BIOGEL SKINSENSE PI 7.5

## (undated) DEVICE — LOOP PASSING HI-FI

## (undated) DEVICE — SYR 30CC LUER LOCK

## (undated) DEVICE — WRAP KNEE ACCU THERM GEL PACK

## (undated) DEVICE — BANDAGE ELAS SOFTWRAP ST 4X5YD

## (undated) DEVICE — DRAPE STERI INSTRUMENT 1018

## (undated) DEVICE — UNDERGLOVES BIOGEL PI SZ 7 LF

## (undated) DEVICE — BLADE #15 STERILE CARBON

## (undated) DEVICE — GUIDE GRAFTMAX XACTPIN FLEX
Type: IMPLANTABLE DEVICE | Site: KNEE | Status: NON-FUNCTIONAL
Removed: 2022-02-17

## (undated) DEVICE — SPONGE GAUZE 16PLY 4X4

## (undated) DEVICE — Device

## (undated) DEVICE — PAD ELECTRODE STER 1.5X3

## (undated) DEVICE — DRESSING AQUACEL AG SILVER 4X4

## (undated) DEVICE — GLOVE BIOGEL SKINSENSE PI 7.0

## (undated) DEVICE — PENCIL ROCKER SWITCH 10FT CORD

## (undated) DEVICE — BLADE SHAVER 4.5 6/BX

## (undated) DEVICE — NDL 18GA X1 1/2 REG BEVEL

## (undated) DEVICE — TUBING SUC UNIV W/CONN 12FT

## (undated) DEVICE — CONSTANT DIAMETER REAMER

## (undated) DEVICE — SUT VICRYL PLUS 3-0 SH 18IN

## (undated) DEVICE — CUP MEDICINE STERILE 2OZ

## (undated) DEVICE — SUT LOOP HI-FI 20 WHT/BLU

## (undated) DEVICE — SEE MEDLINE ITEM 146292

## (undated) DEVICE — SOL IRR NACL .9% 3000ML

## (undated) DEVICE — DRAPE INCISE IOBAN 2 23X17IN

## (undated) DEVICE — SOL NACL IRR 3000ML

## (undated) DEVICE — BRACE KNEE T SCOPE PREMIER

## (undated) DEVICE — APPLICATOR CHLORAPREP ORN 26ML

## (undated) DEVICE — SUT MCRYL PLUS 4-0 PS2 27IN

## (undated) DEVICE — PAD ABDOMINAL STERILE 8X10IN

## (undated) DEVICE — DRAPE TOP 53X102IN

## (undated) DEVICE — MARKER SKIN RULER STERILE

## (undated) DEVICE — DRESSING XEROFORM STRL 2X2

## (undated) DEVICE — SYS LABEL CORRECT MED

## (undated) DEVICE — QUADPRO HARVESTER

## (undated) DEVICE — CONCEPT GRAFIX CORING REAMER

## (undated) DEVICE — DRAPE PLASTIC U 60X72

## (undated) DEVICE — SUT PDS II 0 CT-1 VIL MONO

## (undated) DEVICE — GOWN ECLIPSE REINF LV4 XLNG XL

## (undated) DEVICE — SHAVER SYS 5.5 ULRAFRR

## (undated) DEVICE — KIT ANATOMIC ACL DISPOSABLE

## (undated) DEVICE — COVER CAMERA OPERATING ROOM

## (undated) DEVICE — DRAPE EMERALD 87X114.75X113

## (undated) DEVICE — DRAPE T EXTRM SURG 121X128X90

## (undated) DEVICE — REAMER GRAFTMAX FLEX 10MM

## (undated) DEVICE — UNDERGLOVES BIOGEL PI SIZE 8

## (undated) DEVICE — UNDERGLOVES BIOGEL PI SIZE 8.5

## (undated) DEVICE — CONTAINER SPECIMEN OR STER 4OZ